# Patient Record
Sex: MALE | Race: WHITE | NOT HISPANIC OR LATINO | Employment: FULL TIME | URBAN - METROPOLITAN AREA
[De-identification: names, ages, dates, MRNs, and addresses within clinical notes are randomized per-mention and may not be internally consistent; named-entity substitution may affect disease eponyms.]

---

## 2019-02-05 ENCOUNTER — OFFICE VISIT (OUTPATIENT)
Dept: URGENT CARE | Facility: CLINIC | Age: 19
End: 2019-02-05
Payer: COMMERCIAL

## 2019-02-05 VITALS
HEIGHT: 73 IN | SYSTOLIC BLOOD PRESSURE: 122 MMHG | HEART RATE: 83 BPM | RESPIRATION RATE: 16 BRPM | WEIGHT: 210 LBS | TEMPERATURE: 100 F | OXYGEN SATURATION: 95 % | DIASTOLIC BLOOD PRESSURE: 76 MMHG | BODY MASS INDEX: 27.83 KG/M2

## 2019-02-05 DIAGNOSIS — R20.2 TINGLING IN EXTREMITIES: ICD-10-CM

## 2019-02-05 DIAGNOSIS — G47.00 INSOMNIA, UNSPECIFIED TYPE: ICD-10-CM

## 2019-02-05 DIAGNOSIS — F41.9 ANXIETY: ICD-10-CM

## 2019-02-05 LAB — GLUCOSE BLD-MCNC: 93 MG/DL (ref 70–100)

## 2019-02-05 PROCEDURE — 99203 OFFICE O/P NEW LOW 30 MIN: CPT | Performed by: PHYSICIAN ASSISTANT

## 2019-02-05 PROCEDURE — 82962 GLUCOSE BLOOD TEST: CPT | Performed by: PHYSICIAN ASSISTANT

## 2019-02-05 RX ORDER — SUMATRIPTAN 25 MG/1
TABLET, FILM COATED ORAL
COMMUNITY
Start: 2018-11-28

## 2019-02-05 RX ORDER — HYDROXYZINE PAMOATE 25 MG/1
25 CAPSULE ORAL 3 TIMES DAILY PRN
Qty: 30 CAP | Refills: 0 | Status: SHIPPED | OUTPATIENT
Start: 2019-02-05

## 2019-02-05 ASSESSMENT — ENCOUNTER SYMPTOMS
SEIZURES: 0
EYE REDNESS: 0
FOCAL WEAKNESS: 0
TINGLING: 1
INSOMNIA: 1
DIZZINESS: 0
JOINT SWELLING: 0
CONSTIPATION: 0
SPEECH CHANGE: 0
SENSORY CHANGE: 0
PALPITATIONS: 0
FEVER: 0
LOSS OF CONSCIOUSNESS: 0
FATIGUE: 1
SORE THROAT: 0
NERVOUS/ANXIOUS: 1
HEADACHES: 0
EYE DISCHARGE: 0
VISUAL CHANGE: 0
NAUSEA: 1
ABDOMINAL PAIN: 0
MYALGIAS: 0
TINGLING: 1
VOMITING: 0
COUGH: 0
WHEEZING: 0
BLOOD IN STOOL: 0
DIARRHEA: 0
SHORTNESS OF BREATH: 0

## 2019-02-05 NOTE — PROGRESS NOTES
"Subjective:      Elena Palacios is a 18 y.o. male who presents with Tingling (x2 hours, tingling all over body, feels like bristles are on fingers, hx of anxiety and panc attacks. )            Patient is a 18-year-old male who presents with an episode of skin sensitivity and tingling mainly to his upper extremities earlier this morning.  Patient reports that he was up the whole night as he was unable to sleep when he began to close his eyes trying to relax and felt significant skin sensitivity.  During that time he began to focus on the skin sensitivity and began to develop tingling mainly to his arms throughout his upper extremities.  During this time patient began to \"panic \"as he has had episodes like this in the past however this appeared to last longer.  During this time patient denies any chest pain, palpitations or shortness of breath.  Patient did report anxiety and reports history of same in the past of which she is discussed with his primary care provider.  Patient is never taken anything for such.  Patient does admit that he feels significantly better at this time however still feels a little \"off \".  Patient is with a slight elevation in temperature however denies any cough, congestion, sore throat.  He does report increase bowel movement last night however denies any discrete diarrhea.  He did feel nauseated earlier this morning however this also has subsided.  He otherwise denies any abdominal pain.      Tingling   This is a new problem. The current episode started today. The problem has been resolved. Associated symptoms include fatigue and nausea. Pertinent negatives include no abdominal pain, chest pain, congestion, coughing, fever, headaches, joint swelling, myalgias, rash, sore throat, visual change or vomiting. Exacerbated by: \"Thinking about his symptoms\" too much. He has tried nothing for the symptoms.       Review of Systems   Constitutional: Positive for fatigue and malaise/fatigue. " "Negative for fever.   HENT: Negative for congestion and sore throat.    Eyes: Negative for discharge and redness.   Respiratory: Negative for cough, shortness of breath and wheezing.    Cardiovascular: Negative for chest pain and palpitations.   Gastrointestinal: Positive for nausea. Negative for abdominal pain, blood in stool, constipation, diarrhea, melena and vomiting.   Musculoskeletal: Negative for joint swelling and myalgias.   Skin: Negative for itching and rash.   Neurological: Positive for tingling. Negative for dizziness, sensory change, speech change, focal weakness, seizures, loss of consciousness and headaches.   Psychiatric/Behavioral: The patient is nervous/anxious and has insomnia.    All other systems reviewed and are negative.         Objective:     /76   Pulse 83   Temp 37.8 °C (100 °F) (Temporal)   Resp 16   Ht 1.854 m (6' 1\")   Wt 95.3 kg (210 lb)   SpO2 95%   BMI 27.71 kg/m²    PMH:  has no past medical history on file.  MEDS:   Current Outpatient Prescriptions:   •  hydrOXYzine pamoate (VISTARIL) 25 MG Cap, Take 1 Cap by mouth 3 times a day as needed for Anxiety or Other (May cause sedation)., Disp: 30 Cap, Rfl: 0  •  SUMAtriptan (IMITREX) 25 MG Tab tablet, , Disp: , Rfl:   ALLERGIES: No Known Allergies  SURGHX: History reviewed. No pertinent surgical history.  SOCHX:  reports that he has quit smoking. He has never used smokeless tobacco. He reports that he drinks alcohol. He reports that he does not use drugs.  FH: Family history was reviewed, no pertinent findings to report    Physical Exam   Constitutional: He is oriented to person, place, and time. He appears well-developed and well-nourished. No distress.   HENT:   Head: Normocephalic and atraumatic.   Right Ear: External ear normal.   Left Ear: External ear normal.   Mouth/Throat: Oropharynx is clear and moist. No oropharyngeal exudate.   Eyes: Pupils are equal, round, and reactive to light. Conjunctivae and EOM are normal. "   Neck: Normal range of motion. Neck supple. No tracheal deviation present.   Cardiovascular: Normal rate and regular rhythm.    No murmur heard.  Pulmonary/Chest: Effort normal and breath sounds normal. No respiratory distress.   Abdominal: Soft. Bowel sounds are normal. There is no tenderness.   Musculoskeletal: Normal range of motion. He exhibits no edema.   Neurological: He is alert and oriented to person, place, and time. Coordination normal.   Skin: Skin is warm. No rash noted.   Psychiatric: He has a normal mood and affect. His behavior is normal. Judgment and thought content normal.   Vitals reviewed.           Glucose- 93.      Assessment/Plan:     1. Tingling in extremities  - POCT glucose  - hydrOXYzine pamoate (VISTARIL) 25 MG Cap; Take 1 Cap by mouth 3 times a day as needed for Anxiety or Other (May cause sedation).  Dispense: 30 Cap; Refill: 0    2. Anxiety  - hydrOXYzine pamoate (VISTARIL) 25 MG Cap; Take 1 Cap by mouth 3 times a day as needed for Anxiety or Other (May cause sedation).  Dispense: 30 Cap; Refill: 0    At this time patient is very well-appearing without active symptoms at this time.  Glucose is normal for a nonfasting glucose.  Also patient was without any precipitating factors of chest pain, shortness of breath etc.  We will trial the patient on hydroxyzine at this time a low dose and will have the patient follow-up with his PCP ASAP for recheck.  Hydroxyzine was chosen to help with not only with anxiety but also with patient's history of recent insomnia.  Discussed worrisome signs and symptoms to return for and for further recheck.  Patient, his girlfriend and father were all present during the visit today of which they all agree with the plan and understand.  Patient given precautionary s/sx that mandate immediate follow up and evaluation in the ED. Advised of risks of not doing so.    DDX, Supportive care, and indications for immediate follow-up discussed with patient.    Instructed  to return to clinic or nearest emergency department if we are not available for any change in condition, further concerns, or worsening of symptoms.    The patient demonstrated a good understanding and agreed with the treatment plan.  Please note that this dictation was created using voice recognition software. I have made every reasonable attempt to correct obvious errors, but I expect that there are errors of grammar and possibly content that I did not discover before finalizing the note.

## 2019-06-08 ENCOUNTER — HOSPITAL ENCOUNTER (EMERGENCY)
Dept: HOSPITAL 8 - ED | Age: 19
Discharge: HOME | End: 2019-06-08
Payer: COMMERCIAL

## 2019-06-08 VITALS — DIASTOLIC BLOOD PRESSURE: 78 MMHG | SYSTOLIC BLOOD PRESSURE: 116 MMHG

## 2019-06-08 VITALS — HEIGHT: 73 IN | BODY MASS INDEX: 28.14 KG/M2 | WEIGHT: 212.31 LBS

## 2019-06-08 DIAGNOSIS — G43.909: ICD-10-CM

## 2019-06-08 DIAGNOSIS — F41.9: ICD-10-CM

## 2019-06-08 DIAGNOSIS — F17.290: ICD-10-CM

## 2019-06-08 DIAGNOSIS — R00.2: Primary | ICD-10-CM

## 2019-06-08 DIAGNOSIS — R11.0: ICD-10-CM

## 2019-06-08 DIAGNOSIS — Z79.899: ICD-10-CM

## 2019-06-08 LAB
ANION GAP SERPL CALC-SCNC: 5 MMOL/L (ref 5–15)
BASOPHILS # BLD AUTO: 0.1 X10^3/UL (ref 0–0.3)
BASOPHILS NFR BLD AUTO: 1 % (ref 0–1)
CALCIUM SERPL-MCNC: 9 MG/DL (ref 8.5–10.1)
CHLORIDE SERPL-SCNC: 107 MMOL/L (ref 98–107)
CREAT SERPL-MCNC: 1.04 MG/DL (ref 0.7–1.3)
EOSINOPHIL # BLD AUTO: 0.17 X10^3/UL (ref 0–0.8)
EOSINOPHIL NFR BLD AUTO: 2 % (ref 1–7)
ERYTHROCYTE [DISTWIDTH] IN BLOOD BY AUTOMATED COUNT: 13.2 % (ref 9.4–14.8)
LYMPHOCYTES # BLD AUTO: 3.99 X10^3/UL (ref 1–6.1)
LYMPHOCYTES NFR BLD AUTO: 37 % (ref 22–44)
MCH RBC QN AUTO: 30 PG (ref 27.5–34.5)
MCHC RBC AUTO-ENTMCNC: 33.2 G/DL (ref 33.2–36.2)
MCV RBC AUTO: 90.6 FL (ref 81–97)
MD: NO
MONOCYTES # BLD AUTO: 0.76 X10^3/UL (ref 0–1.4)
MONOCYTES NFR BLD AUTO: 7 % (ref 2–9)
NEUTROPHILS # BLD AUTO: 5.76 X10^3/UL (ref 1.8–8)
NEUTROPHILS NFR BLD AUTO: 53 % (ref 42–75)
PLATELET # BLD AUTO: 271 X10^3/UL (ref 130–400)
PMV BLD AUTO: 9 FL (ref 7.4–10.4)
RBC # BLD AUTO: 5.38 X10^6/UL (ref 4.38–5.82)
T4 FREE SERPL-MCNC: 1.22 NG/DL (ref 0.76–1.46)

## 2019-06-08 PROCEDURE — 84439 ASSAY OF FREE THYROXINE: CPT

## 2019-06-08 PROCEDURE — 84443 ASSAY THYROID STIM HORMONE: CPT

## 2019-06-08 PROCEDURE — 99283 EMERGENCY DEPT VISIT LOW MDM: CPT

## 2019-06-08 PROCEDURE — 80048 BASIC METABOLIC PNL TOTAL CA: CPT

## 2019-06-08 PROCEDURE — 36415 COLL VENOUS BLD VENIPUNCTURE: CPT

## 2019-06-08 PROCEDURE — 85025 COMPLETE CBC W/AUTO DIFF WBC: CPT

## 2019-09-18 ENCOUNTER — NON-PROVIDER VISIT (OUTPATIENT)
Dept: OCCUPATIONAL MEDICINE | Facility: CLINIC | Age: 19
End: 2019-09-18

## 2019-09-18 ENCOUNTER — OFFICE VISIT (OUTPATIENT)
Dept: OCCUPATIONAL MEDICINE | Facility: CLINIC | Age: 19
End: 2019-09-18

## 2019-09-18 DIAGNOSIS — Z02.89 VISIT FOR OCCUPATIONAL HEALTH EXAMINATION: ICD-10-CM

## 2019-09-18 PROCEDURE — 8915 PR COMPREHENSIVE PHYSICAL: Performed by: PREVENTIVE MEDICINE

## 2019-09-18 PROCEDURE — 8279 PR URINE 6 PANEL - SEND TO LAB: Performed by: PREVENTIVE MEDICINE

## 2019-09-27 ENCOUNTER — HOSPITAL ENCOUNTER (EMERGENCY)
Facility: MEDICAL CENTER | Age: 19
End: 2019-09-28
Attending: EMERGENCY MEDICINE
Payer: COMMERCIAL

## 2019-09-27 ENCOUNTER — OFFICE VISIT (OUTPATIENT)
Dept: URGENT CARE | Facility: CLINIC | Age: 19
End: 2019-09-27
Payer: COMMERCIAL

## 2019-09-27 VITALS
WEIGHT: 195 LBS | DIASTOLIC BLOOD PRESSURE: 82 MMHG | HEIGHT: 73 IN | TEMPERATURE: 99.1 F | BODY MASS INDEX: 25.84 KG/M2 | HEART RATE: 95 BPM | SYSTOLIC BLOOD PRESSURE: 120 MMHG | OXYGEN SATURATION: 98 % | RESPIRATION RATE: 16 BRPM

## 2019-09-27 DIAGNOSIS — E86.0 DEHYDRATION: ICD-10-CM

## 2019-09-27 DIAGNOSIS — R94.31 ABNORMAL FINDING ON EKG: ICD-10-CM

## 2019-09-27 DIAGNOSIS — R07.9 ACUTE CHEST PAIN: ICD-10-CM

## 2019-09-27 DIAGNOSIS — R00.2 HEART PALPITATIONS: ICD-10-CM

## 2019-09-27 DIAGNOSIS — F43.9 STRESS: ICD-10-CM

## 2019-09-27 DIAGNOSIS — Z72.0 NICOTINE ABUSE: ICD-10-CM

## 2019-09-27 DIAGNOSIS — R00.2 PALPITATIONS: ICD-10-CM

## 2019-09-27 DIAGNOSIS — F41.9 ANXIETY: ICD-10-CM

## 2019-09-27 PROCEDURE — 84484 ASSAY OF TROPONIN QUANT: CPT

## 2019-09-27 PROCEDURE — 80053 COMPREHEN METABOLIC PANEL: CPT

## 2019-09-27 PROCEDURE — 36415 COLL VENOUS BLD VENIPUNCTURE: CPT

## 2019-09-27 PROCEDURE — 93005 ELECTROCARDIOGRAM TRACING: CPT

## 2019-09-27 PROCEDURE — 99213 OFFICE O/P EST LOW 20 MIN: CPT | Performed by: NURSE PRACTITIONER

## 2019-09-27 PROCEDURE — 85025 COMPLETE CBC W/AUTO DIFF WBC: CPT

## 2019-09-27 PROCEDURE — 83690 ASSAY OF LIPASE: CPT

## 2019-09-27 PROCEDURE — 99284 EMERGENCY DEPT VISIT MOD MDM: CPT

## 2019-09-27 PROCEDURE — 93000 ELECTROCARDIOGRAM COMPLETE: CPT | Performed by: NURSE PRACTITIONER

## 2019-09-27 PROCEDURE — 83735 ASSAY OF MAGNESIUM: CPT

## 2019-09-27 PROCEDURE — 94760 N-INVAS EAR/PLS OXIMETRY 1: CPT

## 2019-09-27 PROCEDURE — 93005 ELECTROCARDIOGRAM TRACING: CPT | Performed by: EMERGENCY MEDICINE

## 2019-09-27 PROCEDURE — 99999 PR NO CHARGE: CPT | Performed by: NURSE PRACTITIONER

## 2019-09-27 ASSESSMENT — ENCOUNTER SYMPTOMS
BACK PAIN: 0
HEADACHES: 0
PALPITATIONS: 1
FEVER: 0
CHILLS: 0
TINGLING: 0
SENSORY CHANGE: 0
SHORTNESS OF BREATH: 0
WHEEZING: 0
MYALGIAS: 0
COUGH: 0
NAUSEA: 1

## 2019-09-28 ENCOUNTER — APPOINTMENT (OUTPATIENT)
Dept: RADIOLOGY | Facility: MEDICAL CENTER | Age: 19
End: 2019-09-28
Attending: EMERGENCY MEDICINE
Payer: COMMERCIAL

## 2019-09-28 VITALS
SYSTOLIC BLOOD PRESSURE: 121 MMHG | HEART RATE: 68 BPM | OXYGEN SATURATION: 99 % | DIASTOLIC BLOOD PRESSURE: 49 MMHG | WEIGHT: 194.45 LBS | BODY MASS INDEX: 24.95 KG/M2 | HEIGHT: 74 IN | TEMPERATURE: 98.6 F | RESPIRATION RATE: 16 BRPM

## 2019-09-28 LAB
ALBUMIN SERPL BCP-MCNC: 4.9 G/DL (ref 3.2–4.9)
ALBUMIN/GLOB SERPL: 1.8 G/DL
ALP SERPL-CCNC: 100 U/L (ref 30–99)
ALT SERPL-CCNC: 12 U/L (ref 2–50)
ANION GAP SERPL CALC-SCNC: 10 MMOL/L (ref 0–11.9)
AST SERPL-CCNC: 16 U/L (ref 12–45)
BASOPHILS # BLD AUTO: 0.5 % (ref 0–1.8)
BASOPHILS # BLD: 0.06 K/UL (ref 0–0.12)
BILIRUB SERPL-MCNC: 0.3 MG/DL (ref 0.1–1.5)
BUN SERPL-MCNC: 11 MG/DL (ref 8–22)
CALCIUM SERPL-MCNC: 9.4 MG/DL (ref 8.5–10.5)
CHLORIDE SERPL-SCNC: 107 MMOL/L (ref 96–112)
CO2 SERPL-SCNC: 25 MMOL/L (ref 20–33)
CREAT SERPL-MCNC: 0.9 MG/DL (ref 0.5–1.4)
EOSINOPHIL # BLD AUTO: 0.07 K/UL (ref 0–0.51)
EOSINOPHIL NFR BLD: 0.5 % (ref 0–6.9)
ERYTHROCYTE [DISTWIDTH] IN BLOOD BY AUTOMATED COUNT: 40.3 FL (ref 35.9–50)
GLOBULIN SER CALC-MCNC: 2.7 G/DL (ref 1.9–3.5)
GLUCOSE SERPL-MCNC: 87 MG/DL (ref 65–99)
HCT VFR BLD AUTO: 45.6 % (ref 42–52)
HGB BLD-MCNC: 15.7 G/DL (ref 14–18)
IMM GRANULOCYTES # BLD AUTO: 0.04 K/UL (ref 0–0.11)
IMM GRANULOCYTES NFR BLD AUTO: 0.3 % (ref 0–0.9)
LIPASE SERPL-CCNC: 26 U/L (ref 11–82)
LYMPHOCYTES # BLD AUTO: 2.42 K/UL (ref 1–4.8)
LYMPHOCYTES NFR BLD: 18.7 % (ref 22–41)
MAGNESIUM SERPL-MCNC: 2 MG/DL (ref 1.5–2.5)
MCH RBC QN AUTO: 30.2 PG (ref 27–33)
MCHC RBC AUTO-ENTMCNC: 34.4 G/DL (ref 33.7–35.3)
MCV RBC AUTO: 87.7 FL (ref 81.4–97.8)
MONOCYTES # BLD AUTO: 0.59 K/UL (ref 0–0.85)
MONOCYTES NFR BLD AUTO: 4.6 % (ref 0–13.4)
NEUTROPHILS # BLD AUTO: 9.77 K/UL (ref 1.82–7.42)
NEUTROPHILS NFR BLD: 75.4 % (ref 44–72)
NRBC # BLD AUTO: 0 K/UL
NRBC BLD-RTO: 0 /100 WBC
PLATELET # BLD AUTO: 273 K/UL (ref 164–446)
PMV BLD AUTO: 11.2 FL (ref 9–12.9)
POTASSIUM SERPL-SCNC: 3.8 MMOL/L (ref 3.6–5.5)
PROT SERPL-MCNC: 7.6 G/DL (ref 6–8.2)
RBC # BLD AUTO: 5.2 M/UL (ref 4.7–6.1)
SODIUM SERPL-SCNC: 142 MMOL/L (ref 135–145)
TROPONIN T SERPL-MCNC: <6 NG/L (ref 6–19)
WBC # BLD AUTO: 13 K/UL (ref 4.8–10.8)

## 2019-09-28 PROCEDURE — 700105 HCHG RX REV CODE 258

## 2019-09-28 PROCEDURE — 71045 X-RAY EXAM CHEST 1 VIEW: CPT

## 2019-09-28 PROCEDURE — A9270 NON-COVERED ITEM OR SERVICE: HCPCS | Performed by: EMERGENCY MEDICINE

## 2019-09-28 PROCEDURE — 700102 HCHG RX REV CODE 250 W/ 637 OVERRIDE(OP): Performed by: EMERGENCY MEDICINE

## 2019-09-28 RX ORDER — SODIUM CHLORIDE, SODIUM LACTATE, POTASSIUM CHLORIDE, CALCIUM CHLORIDE 600; 310; 30; 20 MG/100ML; MG/100ML; MG/100ML; MG/100ML
1000 INJECTION, SOLUTION INTRAVENOUS ONCE
Status: COMPLETED | OUTPATIENT
Start: 2019-09-28 | End: 2019-09-28

## 2019-09-28 RX ORDER — ASPIRIN 81 MG/1
324 TABLET, CHEWABLE ORAL ONCE
Status: COMPLETED | OUTPATIENT
Start: 2019-09-28 | End: 2019-09-28

## 2019-09-28 RX ORDER — IPRATROPIUM BROMIDE AND ALBUTEROL SULFATE 2.5; .5 MG/3ML; MG/3ML
3 SOLUTION RESPIRATORY (INHALATION) ONCE
Status: DISCONTINUED | OUTPATIENT
Start: 2019-09-28 | End: 2019-09-28 | Stop reason: HOSPADM

## 2019-09-28 RX ORDER — LORAZEPAM 2 MG/ML
1 INJECTION INTRAMUSCULAR ONCE
Status: DISCONTINUED | OUTPATIENT
Start: 2019-09-28 | End: 2019-09-28

## 2019-09-28 RX ADMIN — SODIUM CHLORIDE, POTASSIUM CHLORIDE, SODIUM LACTATE AND CALCIUM CHLORIDE 1000 ML: 600; 310; 30; 20 INJECTION, SOLUTION INTRAVENOUS at 00:41

## 2019-09-28 RX ADMIN — ASPIRIN 81 MG 324 MG: 81 TABLET ORAL at 00:41

## 2019-09-28 NOTE — DISCHARGE INSTRUCTIONS
You were seen and evaluated in the Emergency Department at [facility] for:     Irregular heartbeat and chest tightness    You had the following tests and studies:    Thankfully her EKG and chest x-ray blood test today look great, we do not know the exact cause of your symptoms but we think you are safe to go home.  Try getting more exercise, improving on your diet, and getting plenty of rest.    ----------------------------    Please make sure to follow up with:    Your primary care provider for recheck and routine health care, but if you have any new or worsening symptoms please return to the ER immediately.    Good luck, we hope you get better soon!  ----------------------------    We always encourage patients to return IMMEDIATELY if they have:  Increased or changing pain, passing out, fevers over 100.4 (taken in your mouth or rectally) for more than 2 days, redness or swelling of skin or tissues, feeling like your heart is beating fast, chest pain that is new or worsening, trouble breathing, feeling like your throat is closing up and can not breath, inability to walk, weakness of any part of your body, new dizziness, severe bleeding that won't stop from any part of your body, if you can't eat or drink, or if you have any other concerns.   If you feel worse, please know that you can always return with any questions, concerns, worse symptoms, or you are feeling unsafe. We certainly cannot say for sure that we have ruled out every illness or dangerous disease, but we feel that at this specific time, your exam, tests, and vital signs like heart rate and blood pressure are safe for discharge.

## 2019-09-28 NOTE — PROGRESS NOTES
Subjective:      Elena Palacios is a 18 y.o. male who presents with Palpitations (Heart beats skips; noticed about 1-2 mo ago then he'd sunshine the symptoms x few days ago. Pt. is going through a lot of stress.     Pt. with HX: Anxiety and panic attacks) and Dry Mouth (Dry throat; noticed today)            HPI New. 18 year old male with chest soreness and palpitations over the last several days. He denies fever, chills, myalgia. He does report some nausea but no vomiting. No shortness of breath or back pain. He does vape. No cough or congestion. Admits to stress and anxiety.  Patient has no known allergies.  Current Outpatient Medications on File Prior to Visit   Medication Sig Dispense Refill   • hydrOXYzine pamoate (VISTARIL) 25 MG Cap Take 1 Cap by mouth 3 times a day as needed for Anxiety or Other (May cause sedation). 30 Cap 0   • SUMAtriptan (IMITREX) 25 MG Tab tablet        No current facility-administered medications on file prior to visit.      Social History     Socioeconomic History   • Marital status: Single     Spouse name: Not on file   • Number of children: Not on file   • Years of education: Not on file   • Highest education level: Not on file   Occupational History   • Not on file   Social Needs   • Financial resource strain: Not on file   • Food insecurity:     Worry: Not on file     Inability: Not on file   • Transportation needs:     Medical: Not on file     Non-medical: Not on file   Tobacco Use   • Smoking status: Former Smoker   • Smokeless tobacco: Never Used   Substance and Sexual Activity   • Alcohol use: Yes     Comment: occ   • Drug use: No   • Sexual activity: Not on file   Lifestyle   • Physical activity:     Days per week: Not on file     Minutes per session: Not on file   • Stress: Not on file   Relationships   • Social connections:     Talks on phone: Not on file     Gets together: Not on file     Attends Baptism service: Not on file     Active member of club or organization: Not  "on file     Attends meetings of clubs or organizations: Not on file     Relationship status: Not on file   • Intimate partner violence:     Fear of current or ex partner: Not on file     Emotionally abused: Not on file     Physically abused: Not on file     Forced sexual activity: Not on file   Other Topics Concern   • Not on file   Social History Narrative   • Not on file     Breast Cancer-related family history is not on file.      Review of Systems   Constitutional: Negative for chills and fever.   Respiratory: Negative for cough, shortness of breath and wheezing.    Cardiovascular: Positive for chest pain and palpitations.   Gastrointestinal: Positive for nausea.   Musculoskeletal: Negative for back pain and myalgias.   Neurological: Negative for tingling, sensory change and headaches.          Objective:     /82 (BP Location: Left arm, Patient Position: Sitting, BP Cuff Size: Adult)   Pulse 95   Temp 37.3 °C (99.1 °F) (Temporal)   Resp 16   Ht 1.854 m (6' 1\")   Wt 88.5 kg (195 lb)   SpO2 98%   BMI 25.73 kg/m²      Physical Exam   Constitutional: He is oriented to person, place, and time. He appears well-developed and well-nourished. No distress.   HENT:   Head: Normocephalic and atraumatic.   Right Ear: External ear and ear canal normal. Tympanic membrane is not injected and not perforated. No middle ear effusion.   Left Ear: External ear and ear canal normal. Tympanic membrane is not injected and not perforated.  No middle ear effusion.   Nose: No mucosal edema.   Mouth/Throat: No oropharyngeal exudate or posterior oropharyngeal erythema.   Eyes: Conjunctivae are normal. Right eye exhibits no discharge. Left eye exhibits no discharge.   Neck: Normal range of motion. Neck supple.   Cardiovascular: Normal rate, regular rhythm and normal heart sounds.   No murmur heard.  Pulmonary/Chest: Effort normal and breath sounds normal. No respiratory distress.   Musculoskeletal: Normal range of motion. "   Normal movement of all 4 extremities.   Lymphadenopathy:     He has no cervical adenopathy.        Right: No supraclavicular adenopathy present.        Left: No supraclavicular adenopathy present.   Neurological: He is alert and oriented to person, place, and time. Gait normal.   Skin: Skin is warm and dry.   Psychiatric: He has a normal mood and affect. His behavior is normal. Thought content normal.   Nursing note and vitals reviewed.              Assessment/Plan:     1. Heart palpitations     2. Abnormal finding on EKG       ST elevation with possible pericarditis on EKG.  To [facility] ED for further evaluation of this.  Spoke with charge nurse and report given.  Differential diagnosis, natural history, supportive care, and indications for immediate follow-up discussed at length.

## 2019-09-28 NOTE — ED TRIAGE NOTES
"Chief Complaint   Patient presents with   • Palpitations     Intermittent palpitations x3 days. \"Feels like I'm skipping heartbeats.\"      /82   Pulse 88   Temp 37 °C (98.6 °F) (Temporal)   Resp 16   Ht 1.88 m (6' 2\")   Wt 88.2 kg (194 lb 7.1 oz)   SpO2 96%   BMI 24.97 kg/m²     17 y/o male presents to ED with complaint of three days of intermittent palpitations.  Patient states he feels like his heart is, \"skipping beats\".  He states he notices these symptoms more at night when he goes to bed and also states he often feels anxious when he goes to bed. No chest pain, no SOB, no recent fevers or illness.    EKG done in triage. Educated on triage process. Placed back in lobby. Advised to notify triage RN with any changes. Apologized for wait times.      "

## 2019-09-28 NOTE — ED NOTES
Discharge instructions given to pt. Prescriptions unchanged. Pt educated, verbalizes understanding. All belongings accounted for. Pt ambulated out of ED with steady gait to go home with friend. PIV removed and dressing applied.

## 2019-09-28 NOTE — ED PROVIDER NOTES
"ED PROVIDER NOTE     Scribed for Bunny Frank M.D. by Winifred Toth. 9/28/2019, 12:03 AM.    CHIEF COMPLAINT  Chief Complaint   Patient presents with   • Palpitations     Intermittent palpitations x3 days. \"Feels like I'm skipping heartbeats.\"        HPI    Primary care provider: None  Means of arrival: Walk In  History obtained from: Patient, girlfriend  History limited by: None    Elena Palacios is a 18 y.o. male who presents with palpitations onset 3-4 days ago. The patient describes palpitations as \"skipping beats\" and has an associated chest soreness that comes and goes. No alleviating measures were attempted. Palpitations are exacerbated by his anxiety. He notes he attends therapy to manage his anxiety of past life events. He is compliant with prescribed Hydroxyzine medication which he takes as needed, probably about once a week his anxiety gets bad enough for him to take hydroxyzine. He states he lives with his girlfriend and denies any stressful events at home. The patient reports recent increased stress from work and school and additional stress from mother who he does not live with. He endorses dry mouth and nausea, but denies any chest pain, abdominal pain, numbness or tingling. The patient denies any suicidal ideation. The patient denies any history of alcohol abuse or use of recreational drugs. He notes he has been sleeping 5-6 hours per night. Patient admits to vaping. He additionally notes he has not been eating well.    The patient additionally expresses concern for left sided rib pain.       REVIEW OF SYSTEMS  Constitutional: Negative for fever or chills.   HENT: Dry mouth, negative for nosebleeds.  Respiratory: Negative for cough or shortness of breath.    Cardiovascular: Palpitations, chest soreness.  Gastrointestinal: Negative for nausea, vomiting, or abdominal pain.   Genitourinary: Negative for dysuria or flank pain.   Musculoskeletal: Negative for back pain or joint pain.   Skin: " "Negative for itching or rash.   Neurological: Negative for sensory or motor changes.   Psychiatric/Behavioral: Anxiety, Negative for depression  All other systems reviewed and are negative.    PAST MEDICAL HISTORY   has a past medical history of Anxiety and Panic attacks.    PAST FAMILY HISTORY  History reviewed. No pertinent family history.    SOCIAL HISTORY  Social History     Tobacco Use   • Smoking status: Former Smoker   • Smokeless tobacco: Never Used   Substance and Sexual Activity   • Alcohol use: Yes     Comment: occ   • Drug use: No       SURGICAL HISTORY  patient denies any surgical history    CURRENT MEDICATIONS  Current Outpatient Medications:   •  SUMAtriptan (IMITREX) 25 MG Tab tablet, , Disp: , Rfl:   •  hydrOXYzine pamoate (VISTARIL) 25 MG Cap, Take 1 Cap by mouth 3 times a day as needed for Anxiety or Other (May cause sedation)., Disp: 30 Cap, Rfl: 0    ALLERGIES  No Known Allergies    PHYSICAL EXAM  VITAL SIGNS: /79   Pulse 86   Temp 37 °C (98.6 °F) (Temporal)   Resp 16   Ht 1.88 m (6' 2\")   Wt 88.2 kg (194 lb 7.1 oz)   SpO2 100%   BMI 24.97 kg/m²    Pulse ox interpretation: On room air, I interpret this pulse ox as normal.  Constitutional: Well-developed, well-nourished. Sitting up.   HEENT: Normocephalic, atraumatic. Posterior pharynx clear, mucous membranes dry.  Eyes:  EOMI. Normal sclerae.  Neck: Supple, nontender.  Chest/Pulmonary: Slightly prolonged expiratory phase, no focal wheezes. Clear to ausculation bilaterally, no rhonchi.  Cardiovascular: Regular rate and rhythm, no murmur.   Abdomen: Soft, nontender; no rebound, guarding, or masses.  Back: No CVA or midline tenderness.   Musculoskeletal: No deformity or edema.  Neuro: Clear speech, normal coordination, cranial nerves II-XII grossly intact, no focal asymmetry or sensory deficits.   Psych: Anxious but cooperative, affect is flat.  Skin: No rashes, warm and dry.    DIAGNOSTIC STUDIES / PROCEDURES    LABS & EKG  Results " for orders placed or performed during the hospital encounter of 09/27/19   CBC WITH DIFFERENTIAL   Result Value Ref Range    WBC 13.0 (H) 4.8 - 10.8 K/uL    RBC 5.20 4.70 - 6.10 M/uL    Hemoglobin 15.7 14.0 - 18.0 g/dL    Hematocrit 45.6 42.0 - 52.0 %    MCV 87.7 81.4 - 97.8 fL    MCH 30.2 27.0 - 33.0 pg    MCHC 34.4 33.7 - 35.3 g/dL    RDW 40.3 35.9 - 50.0 fL    Platelet Count 273 164 - 446 K/uL    MPV 11.2 9.0 - 12.9 fL    Neutrophils-Polys 75.40 (H) 44.00 - 72.00 %    Lymphocytes 18.70 (L) 22.00 - 41.00 %    Monocytes 4.60 0.00 - 13.40 %    Eosinophils 0.50 0.00 - 6.90 %    Basophils 0.50 0.00 - 1.80 %    Immature Granulocytes 0.30 0.00 - 0.90 %    Nucleated RBC 0.00 /100 WBC    Neutrophils (Absolute) 9.77 (H) 1.82 - 7.42 K/uL    Lymphs (Absolute) 2.42 1.00 - 4.80 K/uL    Monos (Absolute) 0.59 0.00 - 0.85 K/uL    Eos (Absolute) 0.07 0.00 - 0.51 K/uL    Baso (Absolute) 0.06 0.00 - 0.12 K/uL    Immature Granulocytes (abs) 0.04 0.00 - 0.11 K/uL    NRBC (Absolute) 0.00 K/uL   COMP METABOLIC PANEL   Result Value Ref Range    Sodium 142 135 - 145 mmol/L    Potassium 3.8 3.6 - 5.5 mmol/L    Chloride 107 96 - 112 mmol/L    Co2 25 20 - 33 mmol/L    Anion Gap 10.0 0.0 - 11.9    Glucose 87 65 - 99 mg/dL    Bun 11 8 - 22 mg/dL    Creatinine 0.90 0.50 - 1.40 mg/dL    Calcium 9.4 8.5 - 10.5 mg/dL    AST(SGOT) 16 12 - 45 U/L    ALT(SGPT) 12 2 - 50 U/L    Alkaline Phosphatase 100 (H) 30 - 99 U/L    Total Bilirubin 0.3 0.1 - 1.5 mg/dL    Albumin 4.9 3.2 - 4.9 g/dL    Total Protein 7.6 6.0 - 8.2 g/dL    Globulin 2.7 1.9 - 3.5 g/dL    A-G Ratio 1.8 g/dL   TROPONIN   Result Value Ref Range    Troponin T <6 6 - 19 ng/L   MAGNESIUM   Result Value Ref Range    Magnesium 2.0 1.5 - 2.5 mg/dL   LIPASE   Result Value Ref Range    Lipase 26 11 - 82 U/L   ESTIMATED GFR   Result Value Ref Range    GFR If African American >60 >60 mL/min/1.73 m 2    GFR If Non African American >60 >60 mL/min/1.73 m 2   EKG   Result Value Ref Range    Report        [facility] Emergency Dept.    Test Date:  2019  Pt Name:    CANDACE ARTIS              Department: ER  MRN:        [id number]                      Room:  Gender:     Male                         Technician: [id number]  :        2000                   Requested By:ER TRIAGE PROTOCOL  Order #:    [id number]                    Reading MD: Bunny Frank MD    Measurements  Intervals                                Axis  Rate:       90                           P:          52  MO:         132                          QRS:        70  QRSD:       94                           T:          51  QT:         360  QTc:        441    Interpretive Statements  SINUS RHYTHM  No previous ECG available for comparison  No STEMI or strain or dysrhythmia  Electronically Signed On 2019 1:12:09 PDT by Bunny Frank MD         All labs reviewed by me.    RADIOLOGY  DX-CHEST-PORTABLE (1 VIEW)   Final Result      No acute cardiopulmonary abnormality.          COURSE & MEDICAL DECISION MAKING      This is a 18 y.o. male who presents with palpitations chest pain and anxiety.    Differential Diagnosis includes but is not limited to:  Dysrhythmia, anxiety, ACS, PE, Asthma, Stress, electrolyte abnormality    ED Course:  12:11 AM Patient presents to the ED with palpitations onset 3-4 days ago. Patient will be treated with lactated ringers infusion Bolus, Duoneb, and Aspirin 324 mg. The patient will receive IV fluids for concerns of dehydration and NPO in case a surgical process is present.  Ordered for Dx chest, EKG, eGFR, Troponin, CMP, CBC with differential, Lipase, Magnesium to evaluate his symptoms. He was consoled to quit vaping and I recommended him to use low dose nicotine gum.     I informed the patient that his EKG was reassuring and labs and imaging will be ordered for further evaluation. He is understanding and agreeable with plan of care.     Thankfully work-up is reassuring.  White count  slightly elevated but no fevers here highly doubt infection.  He is not anemic.  Elect lites are stable no severe acidosis.  LFTs and lipase are reassuring doubt hepatobiliary disease.  Troponin is not detectable, highly doubt ACS heart score is at most 1 for risk factors.  Chest x-ray nothing acute.    On recheck the patient is resting comfortably, his mucous membranes are more moist and I feel he has had a positive response to parenteral rehydration.  Discussed reassuring work-up and he is relieved.  I feel he is safe for outpatient follow-up with his primary care provider for recheck and routine health care, and have asked him to return immediately for any new or worsening symptoms.  Advised him to stop nicotine abuse, try healthier diet, and try to get plenty of rest and exercise.      Medications   aspirin (ASA) chewable tab 324 mg (324 mg Oral Given 9/28/19 0041)   lactated ringers infusion (BOLUS) (0 mL Intravenous Stopped 9/28/19 0219)     FINAL IMPRESSION  1. Acute chest pain    2. Palpitations    3. Anxiety    4. Nicotine abuse    5. Stress    6. Dehydration        PRESCRIPTIONS  Discharge Medication List as of 9/28/2019  2:26 AM          FOLLOW UP  [facility], Emergency Dept  [address] [id number] [phone]  Today  If you have ANY new or worse symptoms!    Your primary care provider    Schedule an appointment as soon as possible for a visit in 2 days  for recheck and routine health care      -DISCHARGE-     The patient is referred to a primary physician for follow-up of today's complaint, as well as blood pressure management, diabetic screening, and for all other preventative health concerns.     Pertinent Labs & Imaging studies reviewed and verified by myself, as well as nursing notes and the patient's past medical, family, and social histories (See chart for details).    Results, exam findings, clinical impression, presumed diagnosis, treatment options, and  strict return precautions were discussed with the patient, and they verbalized understanding, agreed with, and appreciated the plan of care.    I, Winifred Toth (Scribe), am scribing for, and in the presence of, Bunny Frank M.D..    Electronically signed by: Winifred Toth (Scribe), 9/28/2019    IBunny M.D. personally performed the services described in this documentation, as scribed by Winifred Toth in my presence, and it is both accurate and complete.    Portions of this record were made with voice recognition software.  Despite my review, spelling/grammar/context errors may still remain.  Interpretation of this chart should be taken in this context. C    The note accurately reflects work and decisions made by me.  Bunny Frank  9/29/2019  1:53 AM

## 2019-09-29 LAB — EKG IMPRESSION: NORMAL

## 2020-03-08 ENCOUNTER — OFFICE VISIT (OUTPATIENT)
Dept: URGENT CARE | Facility: CLINIC | Age: 20
End: 2020-03-08
Payer: COMMERCIAL

## 2020-03-08 VITALS
BODY MASS INDEX: 24.92 KG/M2 | RESPIRATION RATE: 14 BRPM | OXYGEN SATURATION: 96 % | HEART RATE: 98 BPM | TEMPERATURE: 99.1 F | HEIGHT: 75 IN | DIASTOLIC BLOOD PRESSURE: 88 MMHG | WEIGHT: 200.4 LBS | SYSTOLIC BLOOD PRESSURE: 120 MMHG

## 2020-03-08 DIAGNOSIS — Z20.828 EXPOSURE TO INFLUENZA: ICD-10-CM

## 2020-03-08 DIAGNOSIS — R68.89 FLU-LIKE SYMPTOMS: ICD-10-CM

## 2020-03-08 DIAGNOSIS — J06.9 VIRAL URI WITH COUGH: ICD-10-CM

## 2020-03-08 LAB
FLUAV+FLUBV AG SPEC QL IA: NEGATIVE
INT CON NEG: NEGATIVE
INT CON POS: POSITIVE

## 2020-03-08 PROCEDURE — 99214 OFFICE O/P EST MOD 30 MIN: CPT | Performed by: NURSE PRACTITIONER

## 2020-03-08 PROCEDURE — 87804 INFLUENZA ASSAY W/OPTIC: CPT | Performed by: NURSE PRACTITIONER

## 2020-03-08 ASSESSMENT — ENCOUNTER SYMPTOMS
SHORTNESS OF BREATH: 0
COUGH: 1
FATIGUE: 1
SORE THROAT: 0
NAUSEA: 0
DIZZINESS: 0
SINUS PAIN: 0
SPUTUM PRODUCTION: 0
WHEEZING: 0
CHILLS: 1
FEVER: 1
HEADACHES: 0

## 2020-03-08 ASSESSMENT — FIBROSIS 4 INDEX: FIB4 SCORE: 0.34

## 2020-03-08 NOTE — LETTER
March 8, 2020         Patient: Elena Palacios   YOB: 2000   Date of Visit: 3/8/2020           To Whom it May Concern:    Elena Palacios was seen in my clinic on 3/8/2020. Please excuse him from work on 03/09/2020. .    If you have any questions or concerns, please don't hesitate to call.        Sincerely,           ADILENE Manriquez.  Electronically Signed

## 2020-03-08 NOTE — PROGRESS NOTES
Subjective:      Elena Palacios is a 19 y.o. male who presents with Fatigue (little cough, little headaches, sinus does not feel great, brother at home has the flu  x last night 2 am )            Fatigue   This is a new problem. The current episode started yesterday. The problem occurs intermittently. The problem has been gradually worsening. Associated symptoms include chills, congestion, coughing, fatigue and a fever. Pertinent negatives include no headaches, nausea, rash or sore throat. Associated symptoms comments: She reports urgent care for new problem.  States that he is feeling a little fatigued has a slight headache, sinus congestion this also did last night at 2 AM.  Also admits to positive influenza exposure.  Patient is not taking anything over-the-counter for symptoms.  Admits to subjective fever and chills.PMH includes asthma and seasonal allergies. Nothing aggravates the symptoms. He has tried nothing for the symptoms.       Review of Systems   Constitutional: Positive for chills, fatigue, fever and malaise/fatigue.   HENT: Positive for congestion. Negative for sinus pain and sore throat.    Respiratory: Positive for cough. Negative for sputum production, shortness of breath and wheezing.    Gastrointestinal: Negative for nausea.   Skin: Negative for itching and rash.   Neurological: Negative for dizziness and headaches.     Past Medical History:   Diagnosis Date   • Anxiety    • Panic attacks     History reviewed. No pertinent surgical history.   Social History     Socioeconomic History   • Marital status: Single     Spouse name: Not on file   • Number of children: Not on file   • Years of education: Not on file   • Highest education level: Not on file   Occupational History   • Not on file   Social Needs   • Financial resource strain: Not on file   • Food insecurity     Worry: Not on file     Inability: Not on file   • Transportation needs     Medical: Not on file     Non-medical: Not on file  "  Tobacco Use   • Smoking status: Former Smoker   • Smokeless tobacco: Never Used   Substance and Sexual Activity   • Alcohol use: Yes     Comment: occ   • Drug use: No   • Sexual activity: Not on file   Lifestyle   • Physical activity     Days per week: Not on file     Minutes per session: Not on file   • Stress: Not on file   Relationships   • Social connections     Talks on phone: Not on file     Gets together: Not on file     Attends [employer] service: Not on file     Active member of club or organization: Not on file     Attends meetings of clubs or organizations: Not on file     Relationship status: Not on file   • Intimate partner violence     Fear of current or ex partner: Not on file     Emotionally abused: Not on file     Physically abused: Not on file     Forced sexual activity: Not on file   Other Topics Concern   • Not on file   Social History Narrative   • Not on file    Patient has no known allergies.         Objective:     /88   Pulse 98   Temp 37.3 °C (99.1 °F) (Temporal)   Resp 14   Ht 1.905 m (6' 3\")   Wt 90.9 kg (200 lb 6.4 oz)   SpO2 96%   BMI 25.05 kg/m²      Physical Exam  Vitals signs reviewed.   Constitutional:       Appearance: Normal appearance.   HENT:      Right Ear: Tympanic membrane, ear canal and external ear normal.      Left Ear: Tympanic membrane, ear canal and external ear normal.      Nose: Congestion and rhinorrhea present.      Right Sinus: No maxillary sinus tenderness or frontal sinus tenderness.      Left Sinus: No maxillary sinus tenderness or frontal sinus tenderness.      Mouth/Throat:      Lips: Pink.      Mouth: Mucous membranes are moist.      Pharynx: Uvula midline.      Comments: Post nasal drip noted  Cardiovascular:      Rate and Rhythm: Normal rate and regular rhythm.      Heart sounds: Normal heart sounds.   Pulmonary:      Effort: Pulmonary effort is normal.      Breath sounds: Normal breath sounds.   Lymphadenopathy:      Cervical: Cervical " adenopathy present.   Skin:     General: Skin is warm.   Neurological:      Mental Status: He is alert and oriented to person, place, and time.   Psychiatric:         Mood and Affect: Mood normal.         Behavior: Behavior normal.         Thought Content: Thought content normal.         Judgment: Judgment normal.                 Assessment/Plan:       1. Flu-like symptoms  - POCT Influenza A/B - negative    2. Exposure to influenza  - POCT Influenza A/B    3. Viral URI with cough    Discussed physical examination findings as well as length of patient symptoms and negative influenza testing in clinic is likely viral in etiology.  Discussed Intermedic care including increase fluids using electrolyte enriched beverages over-the-counter NSAIDs and Tylenol per 's instructions and starting Flonase and Mucinex for postnasal drip and chest congestion if he needs it.    Work note provided    Supportive care, differential diagnoses, and indications for immediate follow-up discussed with patient.    Pathogenesis of diagnosis discussed including typical length and natural progression. Patient expresses understanding and agrees to plan.    Instructed patient to return to clinic for worsening symptoms or symptoms that persist for 7 to 10 days     Please note that this dictation was created using voice recognition software. I have made every reasonable attempt to correct obvious errors, but I expect that there are errors of grammar and possibly content that I did not discover before finalizing the note.

## 2024-12-03 ENCOUNTER — HOSPITAL ENCOUNTER (OUTPATIENT)
Facility: MEDICAL CENTER | Age: 24
End: 2024-12-03
Attending: NURSE PRACTITIONER
Payer: COMMERCIAL

## 2024-12-03 ENCOUNTER — OFFICE VISIT (OUTPATIENT)
Dept: URGENT CARE | Facility: CLINIC | Age: 24
End: 2024-12-03
Payer: COMMERCIAL

## 2024-12-03 VITALS
WEIGHT: 180 LBS | HEART RATE: 87 BPM | BODY MASS INDEX: 22.38 KG/M2 | RESPIRATION RATE: 16 BRPM | SYSTOLIC BLOOD PRESSURE: 118 MMHG | TEMPERATURE: 97.8 F | OXYGEN SATURATION: 97 % | DIASTOLIC BLOOD PRESSURE: 70 MMHG | HEIGHT: 75 IN

## 2024-12-03 DIAGNOSIS — R39.9 UTI SYMPTOMS: ICD-10-CM

## 2024-12-03 DIAGNOSIS — R31.9 HEMATURIA, UNSPECIFIED TYPE: ICD-10-CM

## 2024-12-03 DIAGNOSIS — R30.0 DYSURIA: ICD-10-CM

## 2024-12-03 DIAGNOSIS — R39.15 URGENCY OF URINATION: ICD-10-CM

## 2024-12-03 PROBLEM — N50.819 PAIN IN TESTICLE: Status: ACTIVE | Noted: 2023-03-23

## 2024-12-03 LAB
APPEARANCE UR: CLEAR
BILIRUB UR STRIP-MCNC: NEGATIVE MG/DL
COLOR UR AUTO: YELLOW
GLUCOSE UR STRIP.AUTO-MCNC: NEGATIVE MG/DL
KETONES UR STRIP.AUTO-MCNC: NEGATIVE MG/DL
LEUKOCYTE ESTERASE UR QL STRIP.AUTO: NEGATIVE
NITRITE UR QL STRIP.AUTO: NEGATIVE
PH UR STRIP.AUTO: 7 [PH] (ref 5–8)
PROT UR QL STRIP: NEGATIVE MG/DL
RBC UR QL AUTO: NORMAL
SP GR UR STRIP.AUTO: 1.01
UROBILINOGEN UR STRIP-MCNC: 0.2 MG/DL

## 2024-12-03 PROCEDURE — 99203 OFFICE O/P NEW LOW 30 MIN: CPT | Performed by: NURSE PRACTITIONER

## 2024-12-03 PROCEDURE — 87086 URINE CULTURE/COLONY COUNT: CPT

## 2024-12-03 PROCEDURE — 81002 URINALYSIS NONAUTO W/O SCOPE: CPT | Performed by: NURSE PRACTITIONER

## 2024-12-03 RX ORDER — SULFAMETHOXAZOLE AND TRIMETHOPRIM 800; 160 MG/1; MG/1
1 TABLET ORAL 2 TIMES DAILY
Qty: 14 TABLET | Refills: 0 | Status: SHIPPED | OUTPATIENT
Start: 2024-12-03 | End: 2024-12-10

## 2024-12-03 ASSESSMENT — ENCOUNTER SYMPTOMS
FLANK PAIN: 0
BACK PAIN: 1
FEVER: 0

## 2024-12-04 NOTE — PROGRESS NOTES
"  Subjective:     Elena Palacios is a 24 y.o. male who presents for Exposure to STD      Woke up with the sensation he was going to \"pee self\". Denies penile discharge, itch, or rash. Has lower abdominal pressure. No concerns for an STI, stating he is in a monogamous relationship. Has a history of intermittent testicular torsion, stating his testicle have been sensitive since then. No new testicular pain or swelling. No history of renal calculi. No flank pain.    Dysuria   This is a new problem. The current episode started today. Associated symptoms include urgency. Pertinent negatives include no flank pain, frequency or hematuria.       Past Medical History:   Diagnosis Date    Anxiety     Panic attacks        History reviewed. No pertinent surgical history.    Social History     Socioeconomic History    Marital status: Single     Spouse name: Not on file    Number of children: Not on file    Years of education: Not on file    Highest education level: Not on file   Occupational History    Not on file   Tobacco Use    Smoking status: Former    Smokeless tobacco: Never   Vaping Use    Vaping status: Every Day   Substance and Sexual Activity    Alcohol use: Yes     Comment: occ    Drug use: No    Sexual activity: Not on file   Other Topics Concern    Not on file   Social History Narrative    Not on file     Social Drivers of Health     Financial Resource Strain: Not on file   Food Insecurity: Not on file   Transportation Needs: Not on file   Physical Activity: Not on file   Stress: Not on file   Social Connections: Not on file   Intimate Partner Violence: Low Risk  (2/27/2023)    Received from [facility]    History of Abuse     History of Abuse: Denies   Housing Stability: Not on file        History reviewed. No pertinent family history.     No Known Allergies    Review of Systems   Constitutional:  Negative for fever.   Genitourinary:  Positive for dysuria and urgency. Negative for flank pain, " "frequency and hematuria.   Musculoskeletal:  Positive for back pain.   All other systems reviewed and are negative.       Objective:   /70   Pulse 87   Temp 36.6 °C (97.8 °F)   Resp 16   Ht 1.905 m (6' 3\")   Wt 81.6 kg (180 lb)   SpO2 97%   BMI 22.50 kg/m²     Physical Exam  Vitals reviewed.   Cardiovascular:      Rate and Rhythm: Normal rate.   Pulmonary:      Effort: Pulmonary effort is normal.   Abdominal:      General: There is no distension.      Palpations: Abdomen is soft.      Tenderness: There is no abdominal tenderness. There is no right CVA tenderness or left CVA tenderness.   Neurological:      Mental Status: He is alert and oriented to person, place, and time.         Assessment/Plan:   1. Dysuria  - POCT Urinalysis  - URINE CULTURE(NEW); Future  - sulfamethoxazole-trimethoprim (BACTRIM DS) 800-160 MG tablet; Take 1 Tablet by mouth 2 times a day for 7 days.  Dispense: 14 Tablet; Refill: 0    2. Urgency of urination  - POCT Urinalysis  - URINE CULTURE(NEW); Future  - sulfamethoxazole-trimethoprim (BACTRIM DS) 800-160 MG tablet; Take 1 Tablet by mouth 2 times a day for 7 days.  Dispense: 14 Tablet; Refill: 0    3. Hematuria, unspecified type  - POCT Urinalysis  - URINE CULTURE(NEW); Future  - sulfamethoxazole-trimethoprim (BACTRIM DS) 800-160 MG tablet; Take 1 Tablet by mouth 2 times a day for 7 days.  Dispense: 14 Tablet; Refill: 0    Results for orders placed or performed in visit on 12/03/24   POCT Urinalysis    Collection Time: 12/03/24  5:07 PM   Result Value Ref Range    POC Color yellow Negative    POC Appearance clear Negative    POC Glucose negative Negative mg/dL    POC Bilirubin negative Negative mg/dL    POC Ketones negative Negative mg/dL    POC Specific Gravity 1.015 <1.005 - >1.030    POC Blood moderate Negative    POC Urine PH 7.0 5.0 - 8.0    POC Protein negative Negative mg/dL    POC Urobiligen 0.2 Negative (0.2) mg/dL    POC Nitrites negative Negative    POC Leukocyte " Esterase negative Negative     -Oral Hydration: Drink plenty of water.  -Follow up with PCP.    Follow up urgently for new or persistent abdominal pain, flank pain, difficulty with urination, fevers, vomiting, weakness, tachycardia, increased blood in urine, or any other concerns.    Presents with acute urinary symptoms x 1 day. Clarified with the patient, he is not concerned for a STI, nor has he had any known exposure. Stable vitals. Afebrile. No CVA or abdominal tenderness to palpation. Hematuria noted. Has dysuria, advised initiating empiric antibiotic therapy with f/u on the culture. He Declined G/C testing. Discussed S&S of renal calculi and follow up for re-evaluation. Also advised F/U with PCP to ensure resolution of hematuria.     Differential diagnosis, natural history, supportive care, and indications for immediate follow-up discussed.

## 2024-12-04 NOTE — PATIENT INSTRUCTIONS
-Oral Hydration: Drink plenty of water.  -Follow up with PCP.    Follow up urgently for new or persistent abdominal pain, flank pain, difficulty with urination, fevers, vomiting, weakness, tachycardia, increased blood in urine, or any other concerns.

## 2024-12-06 LAB
BACTERIA UR CULT: NORMAL
SIGNIFICANT IND 70042: NORMAL
SITE SITE: NORMAL
SOURCE SOURCE: NORMAL